# Patient Record
Sex: MALE
[De-identification: names, ages, dates, MRNs, and addresses within clinical notes are randomized per-mention and may not be internally consistent; named-entity substitution may affect disease eponyms.]

---

## 2022-12-22 PROBLEM — Z00.00 ENCOUNTER FOR PREVENTIVE HEALTH EXAMINATION: Status: ACTIVE | Noted: 2022-12-22

## 2022-12-30 ENCOUNTER — APPOINTMENT (OUTPATIENT)
Dept: OTOLARYNGOLOGY | Facility: CLINIC | Age: 27
End: 2022-12-30
Payer: COMMERCIAL

## 2022-12-30 ENCOUNTER — TRANSCRIPTION ENCOUNTER (OUTPATIENT)
Age: 27
End: 2022-12-30

## 2022-12-30 VITALS — BODY MASS INDEX: 23.7 KG/M2 | WEIGHT: 160 LBS | HEIGHT: 69 IN

## 2022-12-30 DIAGNOSIS — Z72.89 OTHER PROBLEMS RELATED TO LIFESTYLE: ICD-10-CM

## 2022-12-30 DIAGNOSIS — Z87.01 PERSONAL HISTORY OF PNEUMONIA (RECURRENT): ICD-10-CM

## 2022-12-30 DIAGNOSIS — R09.82 POSTNASAL DRIP: ICD-10-CM

## 2022-12-30 DIAGNOSIS — Z78.9 OTHER SPECIFIED HEALTH STATUS: ICD-10-CM

## 2022-12-30 DIAGNOSIS — K21.9 GASTRO-ESOPHAGEAL REFLUX DISEASE W/OUT ESOPHAGITIS: ICD-10-CM

## 2022-12-30 DIAGNOSIS — F12.91 CANNABIS USE, UNSPECIFIED, IN REMISSION: ICD-10-CM

## 2022-12-30 PROCEDURE — 31575 DIAGNOSTIC LARYNGOSCOPY: CPT

## 2022-12-30 PROCEDURE — 99203 OFFICE O/P NEW LOW 30 MIN: CPT | Mod: 25

## 2022-12-30 RX ORDER — BUPROPION HYDROCHLORIDE 100 MG/1
TABLET, FILM COATED ORAL
Refills: 0 | Status: ACTIVE | COMMUNITY

## 2022-12-30 NOTE — HISTORY OF PRESENT ILLNESS
[de-identified] : ORI AGUILERA is a 27 year old patient with a 4-month history of a postnasal drip.  He also has a globus sensation, mild dysphagia and occasional hoarseness.  It may have started after he had an upper respiratory tract infection which may have been COVID.  He has had some improvement but his symptoms persist.  He has no throat pain, nasal congestion or nasal obstruction.  He tried Zyrtec which helps a little bit.  He tried Pepcid but not for very long\par \par He may have seasonal allergies.  He has not had testing.  He did move recently into a new apartment\par He does not smoke tobacco but is exposed to secondhand smoke.  He does smoke marijuana intermittently\par He does not have any pets at home\par

## 2022-12-30 NOTE — ASSESSMENT
[FreeTextEntry1] : He has a 4-month history of a postnasal drip.  His symptoms and findings are consistent with reflux.  He could also have allergies as Zyrtec did help as well.\par \par PLAN\par \par -findings and management options discussed in detail with the patient. \par - Hydration, humidification\par - Saltwater gargles.  The patient was asked to avoid alcohol based mouthwashes\par -I asked him to avoid secondhand smoke exposure and smoking any substances\par - Reflux precautions and elevation of the head of bed at night.  Literature given.\par -He will try Pepcid 20 mg twice daily.  We will consider trying a PPI depending on how he does.  He may also require pH probe testing and GI evaluation\par -I recommended allergy evaluation.\par -He may use an antihistamine and nasal steroid spray as needed\par -He had mentioned he also had tinnitus and eustachian tube dysfunction.  I will speak with him in about obtaining an audiogram when he returns\par - follow up in approximately 3 weeks\par - call and return earlier if any concerns

## 2024-11-21 ENCOUNTER — APPOINTMENT (OUTPATIENT)
Dept: OTOLARYNGOLOGY | Facility: CLINIC | Age: 29
End: 2024-11-21
Payer: COMMERCIAL

## 2024-11-21 VITALS
TEMPERATURE: 98 F | DIASTOLIC BLOOD PRESSURE: 73 MMHG | WEIGHT: 165 LBS | HEART RATE: 79 BPM | OXYGEN SATURATION: 100 % | BODY MASS INDEX: 23.62 KG/M2 | HEIGHT: 70 IN | SYSTOLIC BLOOD PRESSURE: 135 MMHG

## 2024-11-21 DIAGNOSIS — R09.82 POSTNASAL DRIP: ICD-10-CM

## 2024-11-21 DIAGNOSIS — J30.0 VASOMOTOR RHINITIS: ICD-10-CM

## 2024-11-21 PROCEDURE — 99203 OFFICE O/P NEW LOW 30 MIN: CPT

## 2024-11-21 RX ORDER — FLUTICASONE PROPIONATE 50 UG/1
50 SPRAY, METERED NASAL TWICE DAILY
Qty: 1 | Refills: 0 | Status: ACTIVE | COMMUNITY
Start: 2024-11-21 | End: 1900-01-01

## 2024-11-27 PROBLEM — J30.0 CHRONIC VASOMOTOR RHINITIS: Status: ACTIVE | Noted: 2024-11-27
